# Patient Record
Sex: MALE | Race: WHITE | NOT HISPANIC OR LATINO | Employment: OTHER | URBAN - METROPOLITAN AREA
[De-identification: names, ages, dates, MRNs, and addresses within clinical notes are randomized per-mention and may not be internally consistent; named-entity substitution may affect disease eponyms.]

---

## 2017-03-22 ENCOUNTER — ALLSCRIPTS OFFICE VISIT (OUTPATIENT)
Dept: OTHER | Facility: OTHER | Age: 82
End: 2017-03-22

## 2017-09-20 ENCOUNTER — ALLSCRIPTS OFFICE VISIT (OUTPATIENT)
Dept: OTHER | Facility: OTHER | Age: 82
End: 2017-09-20

## 2017-09-20 DIAGNOSIS — R26.9 ABNORMALITY OF GAIT AND MOBILITY: ICD-10-CM

## 2017-09-20 DIAGNOSIS — R25.1 TREMOR: ICD-10-CM

## 2017-09-20 DIAGNOSIS — G20 PARKINSON'S DISEASE (HCC): ICD-10-CM

## 2017-10-26 NOTE — PROGRESS NOTES
Assessment  1  Parkinson's disease (332 0) (G20)   2  Gait disturbance (781 2) (R26 9)   3  Tremor (781 0) (R25 1)    Plan  Gait disturbance, Parkinson's disease, Tremor    · *1 - [facility]  *  Status: Active  Requested for: 39WCG5982   Ordered; For: Gait disturbance, Parkinson's disease, Tremor; Ordered By: Shira Perkins Performed:  Due: 42HDG0968  Care Summary provided  : Yes  Insomnia, Parkinson's disease    · QUEtiapine Fumarate 25 MG Oral Tablet (SEROquel); TAKE 2 TABLET AT  BEDTIME   Rx By: Shira Perkins; Dispense: 90 Days ; #:180 Tablet; Refill: 3;For: Insomnia, Parkinson's disease; MERLE = N; Verified Transmission to [facility]; Msg to Pharmacy: increasing the dose; Last Updated By: System, [employer]; 9/20/2017 10:56:23 AM  Parkinson's disease    · Pramipexole Dihydrochloride 0 25 MG Oral Tablet (Mirapex)   Rx By: Shira Perkins; Dispense: 30 Days ; #:30 TAB; Refill: 5;For: Parkinson's disease; MERLE = N; Sent To: [facility]Y #[id number]  · Carbidopa-Levodopa  MG Oral Tablet; TAKE 2 TABLETS THREE TIMES A  DAY AT 7:00 A  M , NOON AND 6:00 P  M   Rx By: Shira Perkins; Dispense: 90 Days ; #:540 Tablet; Refill: 3;For: Parkinson's disease; MERLE = N; Verified Transmission to [facility]; Last Updated By: System, [employer]; 9/20/2017 10:56:23 AM   · Follow-up visit in 6 months Evaluation and Treatment  Follow-up  Status: Complete   Done: 50PXG7536   Ordered; For: Parkinson's disease; Ordered By: Shira Perkins Performed:  Due: 98GDW2228; Last Updated By: Yoni Rosa; 9/20/2017 11:06:52 AM    Discussion/Summary  Discussion Summary:   1   Sinemet 25/100 2 tid , to continue  Seroquel 25 to 50 mg at ngiht Nefazadone to 200mg at bedtime , decrease the dose Recommend PT for PD and balance training Depression/anxiety due to issues at home , difficutly with daughter , will not add additional meds at this time  f/u in 6mths                      Chief Complaint  Chief Complaint Free Text Note Form: Patient present for neurology follow up travis  History of Present Illness  HPI: this is a 80-year-old gentleman who presents in a neurological followup  He has a history of Parkinson's disease and is on Sinemet 25/ 100 2 tablets 3 times a day  ( 7;30 , noon 6pm ) he also utilizes Seroquel and Serzone at bedtime  No problems sleeping  Tremor occurs but is not bothersome   it occurs when he is worried or upsetand mainly on the right   no longer on Mirapex           , he is accompanied by Leon Rust    he has falls one month two months   he was outside on down and turned he hurt hip and bruised , no head trama   no further falls   was hospitalized 2 mths at [facility] because of low blood pressure because of diffuse weakness and unable to stand up from chair   of back, stretching   stationary bike but does not use it   on right   Seroquel 2 at bedtime   sinemet 25/100 2 tid ,   admits difficulty with daughter which is causing increase in stress            Review of Systems  Neurological ROS:   Constitutional: no fever, no chills, no recent weight gain, no recent weight loss, no complaints of feeling tired, no changes in appetite  HEENT: hearing loss  Cardiovascular:  no chest pain or pressure, no palpitations present, the heart rate was not rapid or irregular, no swelling in the arms or legs, no poor circulation  Respiratory:  no unusual or persistant cough, no shortness of breath with or without exertion  Gastrointestinal:  no nausea, no vomiting, no diarrhea, no abdominal pain, no changes in bowel habits, no melena, no loss of bowel control  Genitourinary:  no incontinence, no feelings of urinary urgency, no increase in frequency, no urinary hesitancy, no dysuria, no hematuria  Musculoskeletal: head/neck/back pain  Integumentary  no masses, no rash, no skin lesions, no livedo reticularis     Psychiatric:  no anxiety, no depression, no mood swings, no psychiatric hospitalizations, no sleep problems  Endocrine  no unusual weight loss or gain, no excessive urination, no excessive thirst, no hair loss or gain, no hot or cold intolerance, no menstrual period change or irregularity, no loss of sexual ability or drive, no erection difficulty, no nipple discharge  Hematologic/Lymphatic:  no unusual bleeding, no tendency for easy bruising, no clotting skin or lumps  Neurological General: snoring--   no headache, no nausea or vomiting, no lightheadedness, no convulsions, no blackouts, no syncope, no trauma, no photopsia, no increased sleepiness, no trouble falling asleep, no snoring, no awakening at night  Neurological Mental Status:  no confusion, no mood swings, no alteration or loss of consciousness, no difficulty expressing/understanding speech, no memory problems  Neurological Cranial Nerves:  no blurry or double vision, no loss of vision, no face drooping, no facial numbness or weakness, no taste or smell loss/changes, no hearing loss or ringing, no vertigo or dizziness, no dysphagia, no slurred speech  Neurological Motor findings include:  no tremor, no twitching, no cramping(pre/post exercise), no atrophy  Neurological Coordination:  no unsteadiness, no vertigo or dizziness, no clumsiness, no problems reaching for objects  Neurological Sensory:  no numbness, no pain, no tingling, does not fall when eyes closed or taking a shower  Neurological Gait: has had falls  Active Problems  1  Acute deep vein thrombosis of lower limb, unspecified laterality   2  Arthralgia (719 40) (M25 50)   3  Backache (724 5) (M54 9)   4  Dizziness and giddiness (780 4) (R42)   5  Gait disturbance (781 2) (R26 9)   6  Insomnia (780 52) (G47 00)   7  Pain in joint of right shoulder region (719 41) (M25 511)   8  Parkinson's disease (332 0) (G20)   9  Sexual dysfunction (302 70) (R37)   10  Shortness of breath (786 05) (R06 02)   11   Tremor (781 0) (R25 1)    Past Medical History  1  History of Diabetes Mellitus (250 00)   2  History of hypertension (V12 59) (Z86 79)   3  History of insomnia (V13 89) (Z87 898)    Family History  Mother    1  No pertinent family history  Family History    2  Family history of Hypertension (V17 49)    Social History   · Caffeine use (V49 89) (F15 90)   · Daily Coffee Consumption (3  Cups/Day)   · Former smoker (A23 05) (R53 135)   · Never Drank Alcohol   · Never Used Drugs    Current Meds   1  Aspirin EC Lo-Dose 81 MG TBEC; Therapy: (Recorded:21Oyd1866) to Recorded   2  Carbidopa-Levodopa  MG Oral Tablet; TAKE 2 TABLETS THREE TIMES A DAY AT   7:00 A  M , NOON AND 6:00 P M;   Therapy: 69PZE4612 to (Evaluate:12Lil2038)  Requested for: 23Xke4840; Last   Rx:[id number] Ordered   3  DilTIAZem HCl  MG Oral Capsule Extended Release 24 Hour; Therapy: (650 6549) to Recorded   4  Doxazosin Mesylate 4 MG TB24;   Therapy: (BSJHIEML:41GXY2939) to Recorded   5  Fenofibrate 50 MG TABS; Therapy: (468 6549) to Recorded   6  Hydrocodone-Acetaminophen 7 5-300 MG Oral Tablet; Therapy: 67Yam0281 to Recorded   7  Lantus SoloStar SOLN;   Therapy: (MMEYREQE:51WDO1478) to Recorded   8  Lasix 20 MG Oral Tablet; Therapy: (422 6549) to Recorded   9  Levothroid 50 MCG TABS; Therapy: (640 6549) to Recorded   10  Lisinopril 10 MG Oral Tablet; Therapy: (468 6549) to Recorded   11  Nefazodone HCl - 100 MG Oral Tablet; TAKE TWO AND ONE-HALF TABLETS AT NIGHT    (AT BEDTIME)  TOTAL DOSE 250 MG; Therapy: 07FBX6645 to (Evaluate:03Qcf3746)  Requested for: 87Dnm6312; Last    Rx:83Niv5917 Ordered   12  Pramipexole Dihydrochloride 0 25 MG Oral Tablet; TAKE 1 TABLET BY MOUTH AT    BEDTIME 2 HOURS PRIOR TO BEDTIME; Therapy: 10ZPS8475 to (9397 8886)  Requested for: 02Ecg0826; Last    Rx:01Oyj8654 Ordered   13  Pravastatin Sodium 20 MG Oral Tablet; Therapy: (651 0387) to Recorded   14  ProAir  (90 Base) MCG/ACT Inhalation Aerosol Solution; Therapy: (827 4449) to Recorded   15  Protonix 40 MG Oral Tablet Delayed Release; Therapy: (897 6922) to Recorded   16  QUEtiapine Fumarate 25 MG Oral Tablet; TAKE 1 TABLET AT BEDTIME; Therapy: 21Aug2017 to (Evaluate:65Mpp3767)  Requested for: 21Aug2017; Last    Rx:21Aug2017 Ordered   17  Qvar 80 MCG/ACT Inhalation Aerosol Solution; Therapy: (728 9271) to Recorded   18  Spiriva HandiHaler 18 MCG Inhalation Capsule; Therapy: (Recorded:04Oct2013) to Recorded    Allergies  1  No Known Drug Allergies    Vitals  Signs   Recorded: 01BYD4640 10:43AM   Heart Rate: 70  Respiration: 16  Systolic: 092, LUE, Sitting  Diastolic: 68, LUE, Sitting  Height: 6 ft   Weight: 277 lb   BMI Calculated: 37 57  BSA Calculated: 2 45    Physical Exam    Constitutional   General appearance: No acute distress, well appearing and well nourished  Musculoskeletal   Gait and station: Abnormal   Gait evaluation demonstrated a wide-based and ataxic gait,-- stooping-- and-- mild shuffling  Muscle strength: Normal strength throughout  Muscle tone: Abnormal     Motor tone:  muscle rigidity was present bilaterally  Involuntary movements: Abnormal involuntary movements were observed  -- (no cogwheel rigidity) A rest tremor was observed in the right upper extremity, but-- not observed in the left upper extremity  -- right worse then left , right tremor with decrease in arm swing  Neurologic   Orientation to person, place, and time: Normal     Recent and remote memory: Demonstrates normal memory  Attention span and concentration: Normal thought process and attention span  Language: Names objects, able to repeat phrases and speaks spontaneously  Fund of knowledge: Normal vocabulary with appropriate knowledge of current events and past history      2nd cranial nerve: Normal     3rd, 4th, and 6th cranial nerves: Normal     5th cranial nerve: Normal     7th cranial nerve: Normal     9th cranial nerve: Normal     11th cranial nerve: Normal     12th cranial nerve: Normal     Reflexes: Abnormal  -- decreased reflexes  Coordination: Normal     Cortical function: Normal     Judgment and insight: Normal     Mood and affect: Normal        Signatures   Electronically signed by :  Sofía Royal DO; Sep 20 2017 11:12AM EST                       (Author)

## 2018-01-14 VITALS
HEART RATE: 70 BPM | HEIGHT: 72 IN | RESPIRATION RATE: 16 BRPM | WEIGHT: 277 LBS | SYSTOLIC BLOOD PRESSURE: 120 MMHG | DIASTOLIC BLOOD PRESSURE: 68 MMHG | BODY MASS INDEX: 37.52 KG/M2

## 2018-01-14 VITALS
HEIGHT: 72 IN | BODY MASS INDEX: 38.91 KG/M2 | RESPIRATION RATE: 16 BRPM | WEIGHT: 287.25 LBS | HEART RATE: 70 BPM | DIASTOLIC BLOOD PRESSURE: 82 MMHG | SYSTOLIC BLOOD PRESSURE: 132 MMHG

## 2018-02-28 RX ORDER — PRAMIPEXOLE DIHYDROCHLORIDE 0.25 MG/1
TABLET ORAL
Qty: 30 TABLET | Refills: 5 | OUTPATIENT
Start: 2018-02-28

## 2018-03-06 ENCOUNTER — TELEPHONE (OUTPATIENT)
Dept: NEUROLOGY | Facility: CLINIC | Age: 83
End: 2018-03-06

## 2018-03-06 DIAGNOSIS — G47.00 INSOMNIA, UNSPECIFIED TYPE: Primary | ICD-10-CM

## 2018-03-06 NOTE — TELEPHONE ENCOUNTER
Pt called asking for refill on nefazodone  Script was sent over in [address] 2017, but pt has not received his medication yet  [facility] and will expedite shipping  He is requesting that a bridge supply be sent to local pharm  Last script was for 100mg 2 5 tabs at hs, per last office note, you had wanted to decrease to 200mg  Per pt he has been taking 2 5tabs  Please advise which dose and please send 30 day supply to local pharm  He has an appt 3/30

## 2018-03-30 ENCOUNTER — OFFICE VISIT (OUTPATIENT)
Dept: NEUROLOGY | Facility: CLINIC | Age: 83
End: 2018-03-30
Payer: MEDICARE

## 2018-03-30 VITALS
HEIGHT: 72 IN | SYSTOLIC BLOOD PRESSURE: 150 MMHG | HEART RATE: 78 BPM | BODY MASS INDEX: 36.16 KG/M2 | WEIGHT: 267 LBS | RESPIRATION RATE: 14 BRPM | DIASTOLIC BLOOD PRESSURE: 80 MMHG

## 2018-03-30 DIAGNOSIS — G20 PARKINSON'S DISEASE (HCC): Primary | ICD-10-CM

## 2018-03-30 DIAGNOSIS — G47.09 OTHER INSOMNIA: ICD-10-CM

## 2018-03-30 DIAGNOSIS — R26.9 GAIT DISTURBANCE: ICD-10-CM

## 2018-03-30 PROCEDURE — 99213 OFFICE O/P EST LOW 20 MIN: CPT | Performed by: PSYCHIATRY & NEUROLOGY

## 2018-03-30 RX ORDER — PANTOPRAZOLE SODIUM 40 MG/1
TABLET, DELAYED RELEASE ORAL
COMMUNITY
Start: 2015-09-09

## 2018-03-30 RX ORDER — ALBUTEROL SULFATE 2.5 MG/3ML
SOLUTION RESPIRATORY (INHALATION)
COMMUNITY
Start: 2018-01-13

## 2018-03-30 RX ORDER — FUROSEMIDE 20 MG/1
20 TABLET ORAL
COMMUNITY
Start: 2017-08-01

## 2018-03-30 RX ORDER — PRAVASTATIN SODIUM 20 MG
TABLET ORAL
COMMUNITY
Start: 2015-09-09

## 2018-03-30 RX ORDER — PEN NEEDLE, DIABETIC 31 GX5/16"
NEEDLE, DISPOSABLE MISCELLANEOUS
COMMUNITY
Start: 2018-02-11

## 2018-03-30 RX ORDER — INSULIN GLARGINE 100 [IU]/ML
INJECTION, SOLUTION SUBCUTANEOUS
Refills: 5 | COMMUNITY
Start: 2018-03-09

## 2018-03-30 RX ORDER — QUETIAPINE FUMARATE 25 MG/1
25 TABLET, FILM COATED ORAL
COMMUNITY
Start: 2016-12-15 | End: 2018-03-30 | Stop reason: ALTCHOICE

## 2018-03-30 RX ORDER — FENOFIBRATE 54 MG/1
1 TABLET ORAL DAILY
COMMUNITY
Start: 2016-11-07

## 2018-03-30 RX ORDER — BENZONATATE 200 MG/1
CAPSULE ORAL
Refills: 0 | COMMUNITY
Start: 2018-03-12

## 2018-03-30 RX ORDER — LEVOTHYROXINE SODIUM 0.05 MG/1
50 TABLET ORAL DAILY
COMMUNITY

## 2018-03-30 RX ORDER — ALBUTEROL SULFATE 90 MCG
HFA AEROSOL WITH ADAPTER (GRAM) INHALATION
Refills: 0 | COMMUNITY
Start: 2018-03-12

## 2018-03-30 NOTE — ASSESSMENT & PLAN NOTE
PD stable , right resting tremor , will continue on Same dose sinemet     Gait stable      Stiffness in hands , worse in am due to Arthitis

## 2018-03-30 NOTE — PROGRESS NOTES
Patient ID: Landen Mena is a 80 y o  male  Assessment/Plan:    Parkinson's disease ([facility] [address] )  PD stable , right resting tremor , will continue on Same dose sinemet     Gait stable      Stiffness in hands , worse in am due to Arthitis     Insomnia  Stop seroquel , serzone same dose     F/u in 6 mths     Gait disturbance  Due to PD and known DJD , uses a cane and sometimes a walker            Subjective:    HPI        The following portions of the patient's history were reviewed and updated as appropriate: allergies, current medications, past family history, past medical history, past social history, past surgical history and problem list     this is a 80-year-old gentleman who presents in a neurological followup  He has a history of Parkinson's disease and is on Sinemet 25/ 100 2 tablets 3 times a day  ( 7;30 , noon 6pm ) he also utilizes Seroquel and Serzone at bedtime  No problems sleeping  Tremor occur on the right  but is not bothersome   it occurs when he is worried or upsetand mainly on the right   no longer on Mirapex      Pain and stiffness in arms in am , worse in the am             Objective:    Blood pressure 150/80, pulse 78, resp  rate 14, height 6' (1 829 m), weight 121 kg (267 lb)  Physical Exam   Constitutional: He appears well-developed  HENT:   Head: Normocephalic  Eyes: EOM are normal  Pupils are equal, round, and reactive to light  Neck: Normal range of motion  Cardiovascular: Normal rate  Neurological: He is alert  Neurological Exam    Mental Status  The patient is alert and oriented to person, place, time, and situation  He has no visuospatial neglect  He has normal attention span and concentration  He has a normal fund of knowledge      Cranial Nerves    CN II: The patient's visual acuity and visual fields are normal   CN III, IV, VI: The patient's pupils are equally round and reactive to light and ocular movements are normal   CN V: The patient has normal facial sensation  CN VII:  The patient has symmetric facial movement  CN VIII:  The patient's hearing is normal   CN IX, X: The patient has symmetric palate movement and normal gag reflex  CN XI: The patient's shoulder shrug strength is normal   CN XII: The patient's tongue is midline without atrophy or fasciculations  Motor   He has abnormal movement  He has right-sided tremor  There is resting tremor of the right arm;  Cogwheel rigity on right , decrease in mike on right      Sensory  The patient's sensation is to light touch  Light touch      Reflexes  Reflexes  trace in ue / le     obese     Gait and Coordination   He has a wide stance  He has a shuffling stride  No frezing          ROS:    Review of Systems   Constitutional: Negative  Negative for appetite change and fever  HENT: Negative for tinnitus, trouble swallowing and voice change  Eyes: Negative  Negative for photophobia and pain  Respiratory: Negative  Negative for shortness of breath  Cardiovascular: Negative  Negative for palpitations  Gastrointestinal: Negative  Negative for nausea and vomiting  Endocrine: Negative  Negative for cold intolerance and heat intolerance  Genitourinary: Negative  Negative for dysuria, frequency and urgency  Musculoskeletal: Negative  Negative for myalgias and neck pain  Skin: Negative  Negative for rash  Neurological: Negative  Negative for dizziness, tremors, seizures, syncope, facial asymmetry, speech difficulty, weakness, light-headedness, numbness and headaches  Tingling   Hematological: Negative  Does not bruise/bleed easily  Psychiatric/Behavioral: Negative  Negative for confusion, hallucinations and sleep disturbance

## 2018-04-04 DIAGNOSIS — G47.00 INSOMNIA, UNSPECIFIED TYPE: ICD-10-CM

## 2018-09-20 DIAGNOSIS — G20 PARKINSON DISEASE (HCC): Primary | ICD-10-CM

## 2018-09-20 RX ORDER — QUETIAPINE FUMARATE 25 MG/1
50 TABLET, FILM COATED ORAL
Qty: 180 TABLET | Refills: 3 | Status: SHIPPED | OUTPATIENT
Start: 2018-09-20

## 2018-11-16 DIAGNOSIS — G47.00 INSOMNIA, UNSPECIFIED TYPE: ICD-10-CM

## 2018-11-18 NOTE — TELEPHONE ENCOUNTER
Pt seen in march but n/s in October   Please call him to schedule a f/u appointment   He can see Veronique in [place]   We can fill script to appointment

## 2018-11-20 NOTE — TELEPHONE ENCOUNTER
I called and spoke to patient emergency contact Sonia Nunez, she took info and said she will call and speak to pt and will have him call us back